# Patient Record
Sex: MALE | Race: WHITE | NOT HISPANIC OR LATINO | Employment: FULL TIME | URBAN - NONMETROPOLITAN AREA
[De-identification: names, ages, dates, MRNs, and addresses within clinical notes are randomized per-mention and may not be internally consistent; named-entity substitution may affect disease eponyms.]

---

## 2017-11-14 ENCOUNTER — OFFICE VISIT (OUTPATIENT)
Dept: RETAIL CLINIC | Facility: CLINIC | Age: 47
End: 2017-11-14

## 2017-11-14 VITALS
OXYGEN SATURATION: 99 % | SYSTOLIC BLOOD PRESSURE: 140 MMHG | TEMPERATURE: 98.6 F | HEIGHT: 74 IN | WEIGHT: 285 LBS | RESPIRATION RATE: 18 BRPM | HEART RATE: 94 BPM | DIASTOLIC BLOOD PRESSURE: 80 MMHG | BODY MASS INDEX: 36.57 KG/M2

## 2017-11-14 DIAGNOSIS — J30.2 ACUTE SEASONAL ALLERGIC RHINITIS, UNSPECIFIED TRIGGER: Primary | ICD-10-CM

## 2017-11-14 PROCEDURE — 99203 OFFICE O/P NEW LOW 30 MIN: CPT | Performed by: NURSE PRACTITIONER

## 2017-11-14 RX ORDER — DEXTROMETHORPHAN HYDROBROMIDE AND PROMETHAZINE HYDROCHLORIDE 15; 6.25 MG/5ML; MG/5ML
5 SYRUP ORAL 4 TIMES DAILY PRN
Qty: 118 ML | Refills: 0 | Status: SHIPPED | OUTPATIENT
Start: 2017-11-14 | End: 2023-01-11

## 2017-11-14 RX ORDER — AZITHROMYCIN 250 MG/1
TABLET, FILM COATED ORAL
Qty: 6 TABLET | Refills: 0 | Status: SHIPPED | OUTPATIENT
Start: 2017-11-14 | End: 2023-01-11

## 2017-11-14 NOTE — PROGRESS NOTES
Chief Complaint   Patient presents with   • Cough   • Sinusitis     Subjective   Kavon Carlson is a 47 y.o. male who presents to the clinic today with complaints of itchy/watery eyes, sinus pressure, rhinorrhea, and cough that has been present for about two days. It started after he had been working in the yard over the weekend. He has been taking Benadryl at night, although he has not noticed a significant improvement. He also took an OTC sinus medication last night and says he could tell it helped.  He denies fever.        The following portions of the patient's history were reviewed and updated as appropriate: allergies, past family history, past medical history, past social history, past surgical history and problem list.      Current Outpatient Prescriptions:   •  azithromycin (ZITHROMAX Z-MARCELINO) 250 MG tablet, Take 2 tablets the first day, then 1 tablet daily for 4 days., Disp: 6 tablet, Rfl: 0  •  promethazine-dextromethorphan (PROMETHAZINE-DM) 6.25-15 MG/5ML syrup, Take 5 mL by mouth 4 (Four) Times a Day As Needed for Cough., Disp: 118 mL, Rfl: 0  Allergies:  Review of patient's allergies indicates no known allergies.  Past Medical History:   Diagnosis Date   • Allergic    • Asthma      Past Surgical History:   Procedure Laterality Date   • KNEE SURGERY       Family History   Problem Relation Age of Onset   • Cancer Father    • Stroke Father    • Cancer Paternal Grandmother      Social History   Substance Use Topics   • Smoking status: Never Smoker   • Smokeless tobacco: None   • Alcohol use No       Review of Systems  Review of Systems   Constitutional: Negative for activity change, appetite change, chills, diaphoresis and fever.   HENT: Positive for congestion (nasal-intermittent), ear pain (pressure; bilateral), postnasal drip, rhinorrhea (clear), sinus pressure, sneezing and sore throat (scratchy). Negative for ear discharge, sinus pain, tinnitus and trouble swallowing.    Eyes: Positive for discharge  "(watery; bilateral), redness (bilateral) and itching (bilateral). Negative for photophobia, pain and visual disturbance.   Respiratory: Positive for cough (dry). Negative for chest tightness, shortness of breath and wheezing.    Cardiovascular: Negative for chest pain.   Gastrointestinal: Negative for diarrhea, nausea and vomiting.   Musculoskeletal: Negative for myalgias.   Allergic/Immunologic: Positive for environmental allergies.   Neurological: Negative for dizziness, light-headedness and headaches.       Objective   /80 (BP Location: Right arm, Patient Position: Sitting, Cuff Size: Adult)  Pulse 94  Temp 98.6 °F (37 °C) (Oral)   Resp 18  Ht 74\" (188 cm)  Wt 285 lb (129 kg)  SpO2 99%  BMI 36.59 kg/m2  Last 2 weights    11/14/17 0905   Weight: 285 lb (129 kg)       Physical Exam   Constitutional: He appears well-developed and well-nourished. No distress.   HENT:   Head: Normocephalic and atraumatic.   Right Ear: External ear normal. Tympanic membrane is not erythematous and not bulging. No middle ear effusion.   Left Ear: External ear normal. Tympanic membrane is not erythematous and not bulging. A middle ear effusion is present.   Nose: No mucosal edema or rhinorrhea. Right sinus exhibits frontal sinus tenderness. Right sinus exhibits no maxillary sinus tenderness. Left sinus exhibits frontal sinus tenderness. Left sinus exhibits no maxillary sinus tenderness.   Mouth/Throat: No oropharyngeal exudate or posterior oropharyngeal erythema. Tonsils are 1+ on the right. Tonsils are 1+ on the left. No tonsillar exudate.   Eyes: EOM are normal. Pupils are equal, round, and reactive to light. Right eye exhibits no discharge, no exudate and no hordeolum. Left eye exhibits no discharge, no exudate and no hordeolum. Right conjunctiva is injected (see picture). Left conjunctiva is injected (see picture).       Neck: Normal range of motion. Neck supple.   Cardiovascular: Normal rate and regular rhythm.  "   Pulmonary/Chest: Effort normal and breath sounds normal. He has no wheezes.   Musculoskeletal: Normal range of motion.   Lymphadenopathy:     He has no cervical adenopathy.   Neurological: He is alert.   Skin: Skin is warm and dry. He is not diaphoretic. No pallor.   Psychiatric: He has a normal mood and affect. His behavior is normal. Judgment and thought content normal.   Vitals reviewed.      Assessment/Plan     Kavon was seen today for cough and sinusitis.    Diagnoses and all orders for this visit:    Acute seasonal allergic rhinitis, unspecified trigger    Other orders  -     promethazine-dextromethorphan (PROMETHAZINE-DM) 6.25-15 MG/5ML syrup; Take 5 mL by mouth 4 (Four) Times a Day As Needed for Cough.  -     azithromycin (ZITHROMAX Z-MARCELINO) 250 MG tablet; Take 2 tablets the first day, then 1 tablet daily for 4 days.      Patient has medrol dose pack at home; I told him he could take this if desired, but he says he doesn't like how they make him feel. I also instructed him to take daily antihistamine ( he also has these at home).

## 2022-01-12 ENCOUNTER — HOSPITAL ENCOUNTER (OUTPATIENT)
Dept: GENERAL RADIOLOGY | Facility: HOSPITAL | Age: 52
Discharge: HOME | End: 2022-01-12
Admitting: INTERNAL MEDICINE

## 2022-01-12 ENCOUNTER — TRANSCRIBE ORDERS (OUTPATIENT)
Dept: GENERAL RADIOLOGY | Facility: HOSPITAL | Age: 52
End: 2022-01-12

## 2022-01-12 DIAGNOSIS — J06.9 ACUTE RESPIRATORY DISEASE: ICD-10-CM

## 2022-01-12 DIAGNOSIS — J06.9 ACUTE RESPIRATORY DISEASE: Primary | ICD-10-CM

## 2022-01-12 PROCEDURE — 71046 X-RAY EXAM CHEST 2 VIEWS: CPT

## 2022-12-12 ENCOUNTER — TRANSCRIBE ORDERS (OUTPATIENT)
Dept: ADMINISTRATIVE | Facility: HOSPITAL | Age: 52
End: 2022-12-12

## 2022-12-12 ENCOUNTER — HOSPITAL ENCOUNTER (OUTPATIENT)
Dept: GENERAL RADIOLOGY | Facility: HOSPITAL | Age: 52
Discharge: HOME | End: 2022-12-12

## 2022-12-12 DIAGNOSIS — R10.9 ABDOMINAL PAIN, UNSPECIFIED ABDOMINAL LOCATION: Primary | ICD-10-CM

## 2022-12-12 DIAGNOSIS — R11.2 NAUSEA AND VOMITING, UNSPECIFIED VOMITING TYPE: ICD-10-CM

## 2022-12-12 DIAGNOSIS — R19.7 DIARRHEA, UNSPECIFIED TYPE: ICD-10-CM

## 2022-12-12 PROCEDURE — 74018 RADEX ABDOMEN 1 VIEW: CPT

## 2023-01-11 ENCOUNTER — OFFICE VISIT (OUTPATIENT)
Dept: INTERNAL MEDICINE | Facility: CLINIC | Age: 53
End: 2023-01-11
Payer: COMMERCIAL

## 2023-01-11 ENCOUNTER — PATIENT ROUNDING (BHMG ONLY) (OUTPATIENT)
Dept: INTERNAL MEDICINE | Facility: CLINIC | Age: 53
End: 2023-01-11
Payer: COMMERCIAL

## 2023-01-11 ENCOUNTER — PRIOR AUTHORIZATION (OUTPATIENT)
Dept: INTERNAL MEDICINE | Facility: CLINIC | Age: 53
End: 2023-01-11
Payer: COMMERCIAL

## 2023-01-11 VITALS
OXYGEN SATURATION: 97 % | HEART RATE: 71 BPM | HEIGHT: 75 IN | SYSTOLIC BLOOD PRESSURE: 141 MMHG | DIASTOLIC BLOOD PRESSURE: 86 MMHG | BODY MASS INDEX: 31.46 KG/M2 | TEMPERATURE: 98.6 F | WEIGHT: 253 LBS

## 2023-01-11 DIAGNOSIS — L98.9 SKIN LESION OF LEFT LEG: ICD-10-CM

## 2023-01-11 DIAGNOSIS — N52.8 OTHER MALE ERECTILE DYSFUNCTION: ICD-10-CM

## 2023-01-11 DIAGNOSIS — Z12.5 PROSTATE CANCER SCREENING: ICD-10-CM

## 2023-01-11 DIAGNOSIS — I10 PRIMARY HYPERTENSION: ICD-10-CM

## 2023-01-11 DIAGNOSIS — R05.3 CHRONIC COUGH: ICD-10-CM

## 2023-01-11 DIAGNOSIS — E11.8 DIABETES MELLITUS TYPE 2 WITH COMPLICATIONS: Primary | ICD-10-CM

## 2023-01-11 DIAGNOSIS — E66.3 OVERWEIGHT: ICD-10-CM

## 2023-01-11 DIAGNOSIS — Z83.2 FAMILY HISTORY OF BLOOD DISEASE: ICD-10-CM

## 2023-01-11 DIAGNOSIS — R79.89 LOW TESTOSTERONE: ICD-10-CM

## 2023-01-11 LAB
EXPIRATION DATE: ABNORMAL
HBA1C MFR BLD: 11 %
Lab: ABNORMAL

## 2023-01-11 PROCEDURE — 83036 HEMOGLOBIN GLYCOSYLATED A1C: CPT | Performed by: INTERNAL MEDICINE

## 2023-01-11 PROCEDURE — 99204 OFFICE O/P NEW MOD 45 MIN: CPT | Performed by: INTERNAL MEDICINE

## 2023-01-11 RX ORDER — HYDROCODONE BITARTRATE AND HOMATROPINE METHYLBROMIDE ORAL SOLUTION 5; 1.5 MG/5ML; MG/5ML
2.5 LIQUID ORAL EVERY 6 HOURS PRN
Qty: 120 ML | Refills: 0 | Status: SHIPPED | OUTPATIENT
Start: 2023-01-11

## 2023-01-11 RX ORDER — DAPAGLIFLOZIN 10 MG/1
10 TABLET, FILM COATED ORAL DAILY
Qty: 30 TABLET | Refills: 1 | Status: SHIPPED | OUTPATIENT
Start: 2023-01-11

## 2023-01-11 RX ORDER — SEMAGLUTIDE 1.34 MG/ML
0.25 INJECTION, SOLUTION SUBCUTANEOUS WEEKLY
Qty: 1.5 ML | Refills: 2 | Status: SHIPPED | OUTPATIENT
Start: 2023-01-11 | End: 2023-02-21 | Stop reason: SDUPTHER

## 2023-01-11 RX ORDER — SILDENAFIL 50 MG/1
50 TABLET, FILM COATED ORAL DAILY PRN
Qty: 10 TABLET | Refills: 5 | Status: SHIPPED | OUTPATIENT
Start: 2023-01-11 | End: 2023-01-25 | Stop reason: SDUPTHER

## 2023-01-11 NOTE — TELEPHONE ENCOUNTER
[facility] PA (Shaw: [id number])  Ozempic (0.25 or 0.5 MG/DOSE) 2MG/1.5ML pen-injectors     Form  [facility] Electronic PA Form (2017 NCPDP)

## 2023-01-11 NOTE — PROGRESS NOTES
January 11, 2023    Hello, may I speak with Kavon Carlson?    My name is LIBRADO      I am  with [facility]  [address] [place] [address] [phone].    Before we get started may I verify your date of birth? 1970    I am calling to officially welcome you to our practice and ask about your recent visit. Is this a good time to talk? yes    Tell me about your visit with us. What things went well?  pt said she got a 10 and was sold on her before he came.  PT stated she has a lot of work to do and loved all the Nursing Staff and Joan who took his blood, she did a phenomenal job.       We're always looking for ways to make our patients' experiences even better. Do you have recommendations on ways we may improve?  no    Overall were you satisfied with your first visit to our practice? yes       I appreciate you taking the time to speak with me today. Is there anything else I can do for you? no      Thank you, and have a great day.

## 2023-01-11 NOTE — PROGRESS NOTES
"        Subjective     Chief Complaint   Patient presents with   • Abdominal Pain     Has some xray and ct scan,  having some trouble with going to rest room,            History of Present Illness  Patient states that he has not been to see an MD in awhile.     He wants to tell the whole picture.  Year before COVID he started to eat LTL diet. Taking BioComplete. Regular BMs and he was \"peeling some weight.\"  315 at his largest. Went down to 260 in about 3-4 months.   In the holidays he started to have hand and foot pain. Thought that it was arthritis or neuropathy but he didn't have the pins and needles feeling.   He saw the [place] and had some issues. His A1c was high.   Had stopped carbonated drinks and replaced with juice. He also had Ed and low testosterone. He states that he never took anything for it and has been a rollicoaster.   Maintaining. 250s for about 2 years now.     COVID x2. Ongoing issues with taste and smell.   Can't eat a lot at any given time.   Started taking Metformin, 2 years since February. Will have diarrhea if he misses a pill and tries to get back on it. Irregular bowel habits.     This fall stomach issues have gained intensity. Increasing stomach pain.   About half calf down on both legs he states that his skin hurts.   He is not checking his glucose currently and his last A1c was 10.   No difficulty with sleeping.     No HX of bleeding. Bowels or kidneys.   He states that occasionally he needs a cough medication and muscle relaxer. He states that he coughs like a bear. He says 3 doses of cough medication and he is done.     Patient's PMR from outside medical facility reviewed and noted.    Review of Systems   Constitutional: Negative for chills and fever.   HENT: Negative for congestion and rhinorrhea.    Respiratory: Negative for cough and shortness of breath.    Cardiovascular: Negative for chest pain and leg swelling.   Gastrointestinal: Positive for diarrhea. Negative for nausea. " "  Genitourinary: Negative for dysuria and hematuria.        ED   Neurological: Positive for numbness. Negative for weakness.   Psychiatric/Behavioral: Negative for dysphoric mood and sleep disturbance.      Otherwise complete ROS reviewed and negative except as mentioned in the HPI.    Past Medical History:   Past Medical History:   Diagnosis Date   • Allergic    • Asthma    • Depression @10 years ago    struggles mainly during the winter   • Diabetes mellitus (HCC) 2 years ago   • Erectile dysfunction @ a year ago     Past Surgical History:  Past Surgical History:   Procedure Laterality Date   • FRACTURE SURGERY  35 years ago    wrist   • KNEE SURGERY       Social History:  reports that he has never smoked. He has never used smokeless tobacco. He reports that he does not drink alcohol and does not use drugs.    Family History: family history includes Cancer in his father and paternal grandmother; Depression in his father; Hearing loss in his father; Stroke in his father.       Allergies:  No Known Allergies  Medications:  Prior to Admission medications    Medication Sig Start Date End Date Taking? Authorizing Provider   metFORMIN (GLUCOPHAGE) 500 MG tablet Take 500 mg by mouth Daily With Breakfast.   Yes Provider, MD Himanshu   azithromycin (ZITHROMAX Z-MARCELINO) 250 MG tablet Take 2 tablets the first day, then 1 tablet daily for 4 days. 11/14/17 1/11/23  Richa Cosby APRN   promethazine-dextromethorphan (PROMETHAZINE-DM) 6.25-15 MG/5ML syrup Take 5 mL by mouth 4 (Four) Times a Day As Needed for Cough. 11/14/17 1/11/23  Richa Cosby APRN       Objective     Vital Signs: /86 (BP Location: Left arm, Patient Position: Sitting, Cuff Size: Adult)   Pulse 71   Temp 98.6 °F (37 °C) (Infrared)   Ht 190.5 cm (75\")   Wt 115 kg (253 lb)   SpO2 97%   BMI 31.62 kg/m²   Physical Exam  Vitals reviewed.   Constitutional:       Appearance: He is obese.   HENT:      Head: Normocephalic and atraumatic.      Right Ear: " External ear normal.      Left Ear: External ear normal.      Nose: Nose normal.   Eyes:      General: No scleral icterus.     Conjunctiva/sclera: Conjunctivae normal.      Comments: Medial redness to the eyes with left eye blister.    Cardiovascular:      Rate and Rhythm: Normal rate and regular rhythm.      Heart sounds: Normal heart sounds.   Pulmonary:      Effort: Pulmonary effort is normal.      Breath sounds: Normal breath sounds.   Musculoskeletal:         General: No swelling or tenderness.      Cervical back: Normal range of motion and neck supple.   Skin:     General: Skin is warm and dry.      Comments: Open wound on the left pre-tibial surface. Mild drainage.    Neurological:      General: No focal deficit present.      Mental Status: He is alert.      Cranial Nerves: No cranial nerve deficit.   Psychiatric:         Mood and Affect: Mood normal.         Behavior: Behavior normal.       BMI is >= 30 and <35. (Class 1 Obesity). The following options were offered after discussion;: nutrition counseling/recommendations      Results Reviewed:  No results found for: GLUCOSE, BUN, CREATININE, NA, K, CL, CO2, CALCIUM, ALT, AST, WBC, HCT, PLT, CHOL, TRIG, HDL, LDL, LDLHDL, HGBA1C      Assessment / Plan     Assessment/Plan:  1. Diabetes mellitus type 2 with complications (HCC)  - POC Glycosylated Hemoglobin (Hb A1C)  - New RX:  - Farxiga 10 mg daily  - Ozempic 0.25 MG weekly     2. Low testosterone  - Testosterone    3. Overweight  - Vitamin D 25 hydroxy    4. Prostate cancer screening  - PSA SCREENING    5. Primary hypertension  - CBC w AUTO Differential  - Comprehensive metabolic panel  - T4  - TSH  - Lipid panel    6. Left leg wound  - Bactroban daily R called    7. ED  Viagra 50 mg PRN    8. Chronic cough  - Hycodan PRN    Reviewed [facility] AP [facility].     Return in about 4 weeks (around 2/8/2023) for Recheck, Next scheduled follow up. unless patient needs to be seen sooner or acute issues arise.    Code  Status: Full     I have discussed the patient results/orders and and plan/recommendation with them at today's visit.      Charo Girard, DO   01/11/2023

## 2023-01-12 ENCOUNTER — PRIOR AUTHORIZATION (OUTPATIENT)
Dept: INTERNAL MEDICINE | Facility: CLINIC | Age: 53
End: 2023-01-12
Payer: COMMERCIAL

## 2023-01-12 NOTE — TELEPHONE ENCOUNTER
Ozempic (0.25 or 0.5 MG/DOSE) 2MG/1.5ML pen-injectors    (Key: [id number])  Rx #: [id number]    Message from Plan  Your PA request has been approved. Additional information will be provided in the approval communication.

## 2023-01-13 LAB
25(OH)D3+25(OH)D2 SERPL-MCNC: 18.8 NG/ML (ref 30–100)
ALBUMIN SERPL-MCNC: 4.2 G/DL (ref 3.8–4.9)
ALBUMIN/GLOB SERPL: 1.7 {RATIO} (ref 1.2–2.2)
ALP SERPL-CCNC: 114 IU/L (ref 44–121)
ALT SERPL-CCNC: 19 IU/L (ref 0–44)
AST SERPL-CCNC: 15 IU/L (ref 0–40)
BASOPHILS # BLD AUTO: 0.1 X10E3/UL (ref 0–0.2)
BASOPHILS NFR BLD AUTO: 1 %
BILIRUB SERPL-MCNC: 0.8 MG/DL (ref 0–1.2)
BUN SERPL-MCNC: 13 MG/DL (ref 6–24)
BUN/CREAT SERPL: 14 (ref 9–20)
CALCIUM SERPL-MCNC: 9.2 MG/DL (ref 8.7–10.2)
CHLORIDE SERPL-SCNC: 100 MMOL/L (ref 96–106)
CHOLEST SERPL-MCNC: 269 MG/DL (ref 100–199)
CO2 SERPL-SCNC: 20 MMOL/L (ref 20–29)
CREAT SERPL-MCNC: 0.95 MG/DL (ref 0.76–1.27)
EGFRCR SERPLBLD CKD-EPI 2021: 95 ML/MIN/1.73
EOSINOPHIL # BLD AUTO: 0.3 X10E3/UL (ref 0–0.4)
EOSINOPHIL NFR BLD AUTO: 4 %
ERYTHROCYTE [DISTWIDTH] IN BLOOD BY AUTOMATED COUNT: 12.7 % (ref 11.6–15.4)
FACT V ACT/NOR PPP: 117 % (ref 70–150)
GLOBULIN SER CALC-MCNC: 2.5 G/DL (ref 1.5–4.5)
GLUCOSE SERPL-MCNC: 287 MG/DL (ref 70–99)
HCT VFR BLD AUTO: 50 % (ref 37.5–51)
HDLC SERPL-MCNC: 26 MG/DL
HGB BLD-MCNC: 17 G/DL (ref 13–17.7)
IMM GRANULOCYTES # BLD AUTO: 0.1 X10E3/UL (ref 0–0.1)
IMM GRANULOCYTES NFR BLD AUTO: 2 %
LDLC SERPL CALC-MCNC: 119 MG/DL (ref 0–99)
LYMPHOCYTES # BLD AUTO: 1.8 X10E3/UL (ref 0.7–3.1)
LYMPHOCYTES NFR BLD AUTO: 26 %
MCH RBC QN AUTO: 30.7 PG (ref 26.6–33)
MCHC RBC AUTO-ENTMCNC: 34 G/DL (ref 31.5–35.7)
MCV RBC AUTO: 90 FL (ref 79–97)
MONOCYTES # BLD AUTO: 0.4 X10E3/UL (ref 0.1–0.9)
MONOCYTES NFR BLD AUTO: 6 %
NEUTROPHILS # BLD AUTO: 4.1 X10E3/UL (ref 1.4–7)
NEUTROPHILS NFR BLD AUTO: 61 %
PLATELET # BLD AUTO: 182 X10E3/UL (ref 150–450)
POTASSIUM SERPL-SCNC: 4.7 MMOL/L (ref 3.5–5.2)
PROT SERPL-MCNC: 6.7 G/DL (ref 6–8.5)
PSA SERPL-MCNC: 2.2 NG/ML (ref 0–4)
RBC # BLD AUTO: 5.54 X10E6/UL (ref 4.14–5.8)
SODIUM SERPL-SCNC: 137 MMOL/L (ref 134–144)
T4 SERPL-MCNC: 6.6 UG/DL (ref 4.5–12)
TESTOST SERPL-MCNC: 162 NG/DL (ref 264–916)
TRIGL SERPL-MCNC: 681 MG/DL (ref 0–149)
TSH SERPL DL<=0.005 MIU/L-ACNC: 5.71 UIU/ML (ref 0.45–4.5)
VLDLC SERPL CALC-MCNC: 124 MG/DL (ref 5–40)
WBC # BLD AUTO: 6.8 X10E3/UL (ref 3.4–10.8)

## 2023-01-17 NOTE — PROGRESS NOTES
T is low.   LDL is too high  Vitamin D is too low    He needs medications for all of these issues. Is he OK with me calling them in?

## 2023-01-24 ENCOUNTER — TELEPHONE (OUTPATIENT)
Dept: INTERNAL MEDICINE | Facility: CLINIC | Age: 53
End: 2023-01-24
Payer: COMMERCIAL

## 2023-01-24 DIAGNOSIS — N52.8 OTHER MALE ERECTILE DYSFUNCTION: Primary | ICD-10-CM

## 2023-01-24 NOTE — TELEPHONE ENCOUNTER
Insurance will not pay for the Sildenafil   They suggest  Vardenafil 10mg,  Tablet  Tadalafil 10mg Tablet  Vardenafil 10mg odt    What do you suggest

## 2023-01-25 RX ORDER — TADALAFIL 10 MG/1
10 TABLET ORAL DAILY PRN
Qty: 10 TABLET | Refills: 5 | Status: SHIPPED | OUTPATIENT
Start: 2023-01-25

## 2023-01-26 ENCOUNTER — PRIOR AUTHORIZATION (OUTPATIENT)
Dept: INTERNAL MEDICINE | Facility: CLINIC | Age: 53
End: 2023-01-26
Payer: COMMERCIAL

## 2023-01-26 NOTE — TELEPHONE ENCOUNTER
Kavon Carlson (Key: [id number])Need help? Call us at [phone]  Status  Sent to [employer]  Next Steps  The plan will fax you a determination, typically within 1 to 5 business days.    How do I follow up?  Drug  Cialis 10MG tablets  Form  [facility] Non-Medicare Tiering Exception Form  Use this form to allow Non-Medicare patients to receive the non-formulary medication at the formulary brand copay.  [phone]  [phone]  Patient address book  Clear  Patient Details

## 2023-01-30 ENCOUNTER — PRIOR AUTHORIZATION (OUTPATIENT)
Dept: INTERNAL MEDICINE | Facility: CLINIC | Age: 53
End: 2023-01-30
Payer: COMMERCIAL

## 2023-01-30 NOTE — TELEPHONE ENCOUNTER
Farxiga 10MG tablets     Key: [id number] - PA Case ID: [id number] - Rx #: [id number]    Approved today  Your PA request has been approved. Additional information will be provided in the approval communication.

## 2023-02-06 DIAGNOSIS — R79.89 LOW TESTOSTERONE: Primary | ICD-10-CM

## 2023-02-06 RX ORDER — TESTOSTERONE GEL, 1% 10 MG/G
50 GEL TRANSDERMAL DAILY
Qty: 5 G | Refills: 3 | Status: SHIPPED | OUTPATIENT
Start: 2023-02-06

## 2023-02-07 ENCOUNTER — TELEPHONE (OUTPATIENT)
Dept: INTERNAL MEDICINE | Facility: CLINIC | Age: 53
End: 2023-02-07
Payer: COMMERCIAL

## 2023-02-09 ENCOUNTER — PRIOR AUTHORIZATION (OUTPATIENT)
Dept: INTERNAL MEDICINE | Facility: CLINIC | Age: 53
End: 2023-02-09
Payer: COMMERCIAL

## 2023-02-09 NOTE — TELEPHONE ENCOUNTER
[facility] AP Key: [id number] - PA Case ID: [id number] - Rx #: [id number] help? Call us at [phone]  Status  Sent to [facility]  Drug  Testosterone 50 MG/5GM(1%) gel  Form  Li Mendez PA Form (2017 NCPDP)  Original Claim Info  [address] CALL [phone].DRUG REQUIRES PRIOR AUTHORIZATION(PHARMACY HELP DESK [phone])

## 2023-02-19 ENCOUNTER — PATIENT MESSAGE (OUTPATIENT)
Dept: INTERNAL MEDICINE | Facility: CLINIC | Age: 53
End: 2023-02-19
Payer: COMMERCIAL

## 2023-02-19 DIAGNOSIS — E11.8 DIABETES MELLITUS TYPE 2 WITH COMPLICATIONS: ICD-10-CM

## 2023-02-19 RX ORDER — SEMAGLUTIDE 1.34 MG/ML
0.25 INJECTION, SOLUTION SUBCUTANEOUS WEEKLY
Qty: 1.5 ML | Refills: 2 | Status: CANCELLED | OUTPATIENT
Start: 2023-02-19

## 2023-02-20 NOTE — TELEPHONE ENCOUNTER
Rx Refill Note  Requested Prescriptions     Pending Prescriptions Disp Refills   • Semaglutide,0.25 or 0.5MG/DOS, (Ozempic, 0.25 or 0.5 MG/DOSE,) 2 MG/1.5ML solution pen-injector 1.5 mL 2     Sig: Inject 0.25 mg under the skin into the appropriate area as directed 1 (One) Time Per Week.      Last office visit with prescribing clinician: 1/11/2023    Next office visit with prescribing clinician: 5/9/2023                         Would you like a call back once the refill request has been completed: [] Yes [] No    If the office needs to give you a call back, can they leave a voicemail: [] Yes [] No    Anna Rivers RN  02/20/23, 07:05 CST

## 2023-02-21 DIAGNOSIS — E11.8 DIABETES MELLITUS TYPE 2 WITH COMPLICATIONS: ICD-10-CM

## 2023-02-21 RX ORDER — SEMAGLUTIDE 1.34 MG/ML
0.25 INJECTION, SOLUTION SUBCUTANEOUS WEEKLY
Qty: 1.5 ML | Refills: 2 | Status: SHIPPED | OUTPATIENT
Start: 2023-02-21 | End: 2023-03-29 | Stop reason: SDUPTHER

## 2023-03-29 DIAGNOSIS — E11.8 DIABETES MELLITUS TYPE 2 WITH COMPLICATIONS: ICD-10-CM

## 2023-03-29 NOTE — TELEPHONE ENCOUNTER
Rx Refill Note  Requested Prescriptions     Pending Prescriptions Disp Refills   • Semaglutide,0.25 or 0.5MG/DOS, (Ozempic, 0.25 or 0.5 MG/DOSE,) 2 MG/1.5ML solution pen-injector 1.5 mL 2     Sig: Inject 0.25 mg under the skin into the appropriate area as directed 1 (One) Time Per Week.      Last office visit with prescribing clinician: 1/11/2023   Last telemedicine visit with prescribing clinician: Visit date not found   Next office visit with prescribing clinician: 5/10/2023     POC Glycosylated Hemoglobin (Hb A1C) (01/11/2023 09:29)                      Would you like a call back once the refill request has been completed: [] Yes [] No    If the office needs to give you a call back, can they leave a voicemail: [] Yes [] No    Che Mello MA  03/29/23, 09:14 CDT

## 2023-03-29 NOTE — TELEPHONE ENCOUNTER
I called pt to reschedule an appt and pt stated he is out of Ozempic and was due his dose on Sunday.  Pt uses [facility] in [place].   Do we have samples pt can  if Ozempic isn't available or until Dr Girard can send in script?

## 2023-03-30 RX ORDER — SEMAGLUTIDE 1.34 MG/ML
0.25 INJECTION, SOLUTION SUBCUTANEOUS WEEKLY
Qty: 1.5 ML | Refills: 2 | Status: SHIPPED | OUTPATIENT
Start: 2023-03-30

## 2023-04-16 DIAGNOSIS — E11.8 DIABETES MELLITUS TYPE 2 WITH COMPLICATIONS: ICD-10-CM

## 2023-04-17 NOTE — TELEPHONE ENCOUNTER
Rx Refill Note  Requested Prescriptions     Pending Prescriptions Disp Refills   • dapagliflozin Propanediol (Farxiga) 10 MG tablet 30 tablet 1     Sig: Take 10 mg by mouth Daily.      Last office visit with prescribing clinician: 1/11/2023   Next office visit with prescribing clinician: 5/10/2023                         Would you like a call back once the refill request has been completed: [] Yes [] No    If the office needs to give you a call back, can they leave a voicemail: [] Yes [] No    Anna Rivers RN  04/17/23, 08:20 CDT

## 2023-04-18 RX ORDER — DAPAGLIFLOZIN 10 MG/1
10 TABLET, FILM COATED ORAL DAILY
Qty: 30 TABLET | Refills: 1 | Status: SHIPPED | OUTPATIENT
Start: 2023-04-18

## 2023-04-23 DIAGNOSIS — E11.8 DIABETES MELLITUS TYPE 2 WITH COMPLICATIONS: ICD-10-CM

## 2023-04-24 RX ORDER — SEMAGLUTIDE 1.34 MG/ML
0.5 INJECTION, SOLUTION SUBCUTANEOUS WEEKLY
Qty: 3 ML | Refills: 2 | Status: SHIPPED | OUTPATIENT
Start: 2023-04-24

## 2023-04-24 NOTE — TELEPHONE ENCOUNTER
Rx Refill Note  Requested Prescriptions     Pending Prescriptions Disp Refills   • Semaglutide,0.25 or 0.5MG/DOS, (Ozempic, 0.25 or 0.5 MG/DOSE,) 2 MG/1.5ML solution pen-injector 1.5 mL 2     Sig: Inject 0.25 mg under the skin into the appropriate area as directed 1 (One) Time Per Week.      Last office visit with prescribing clinician: 1/11/2023   Next office visit with prescribing clinician: 5/10/2023                         Would you like a call back once the refill request has been completed: [] Yes [] No    If the office needs to give you a call back, can they leave a voicemail: [] Yes [] No    Anna Rivers RN  04/24/23, 07:43 CDT

## 2023-05-10 ENCOUNTER — OFFICE VISIT (OUTPATIENT)
Dept: INTERNAL MEDICINE | Facility: CLINIC | Age: 53
End: 2023-05-10
Payer: COMMERCIAL

## 2023-05-10 VITALS
DIASTOLIC BLOOD PRESSURE: 80 MMHG | OXYGEN SATURATION: 99 % | BODY MASS INDEX: 32.08 KG/M2 | WEIGHT: 258 LBS | HEART RATE: 67 BPM | HEIGHT: 75 IN | SYSTOLIC BLOOD PRESSURE: 130 MMHG | TEMPERATURE: 98.4 F

## 2023-05-10 DIAGNOSIS — G62.9 NEUROPATHY: ICD-10-CM

## 2023-05-10 DIAGNOSIS — R79.89 LOW TESTOSTERONE IN MALE: ICD-10-CM

## 2023-05-10 DIAGNOSIS — I10 PRIMARY HYPERTENSION: ICD-10-CM

## 2023-05-10 DIAGNOSIS — E11.8 DIABETES MELLITUS TYPE 2 WITH COMPLICATIONS: Primary | ICD-10-CM

## 2023-05-10 LAB
EXPIRATION DATE: ABNORMAL
HBA1C MFR BLD: 6.8 %
Lab: ABNORMAL

## 2023-05-10 RX ORDER — SEMAGLUTIDE 1.34 MG/ML
0.5 INJECTION, SOLUTION SUBCUTANEOUS WEEKLY
Qty: 3 ML | Refills: 2 | Status: SHIPPED | OUTPATIENT
Start: 2023-05-10

## 2023-05-10 RX ORDER — GABAPENTIN 300 MG/1
300 CAPSULE ORAL NIGHTLY PRN
Qty: 30 CAPSULE | Refills: 2 | Status: SHIPPED | OUTPATIENT
Start: 2023-05-10

## 2023-05-10 RX ORDER — DAPAGLIFLOZIN 10 MG/1
10 TABLET, FILM COATED ORAL DAILY
Qty: 30 TABLET | Refills: 1 | Status: SHIPPED | OUTPATIENT
Start: 2023-05-10

## 2023-05-10 NOTE — PROGRESS NOTES
Subjective     Chief Complaint   Patient presents with   • Hypertension   • Diabetes       History of Present Illness  He states that his stomach is 1000% better. Mild constipation with the medication and it is improving over time. Might be a little sluggish or achy when it is a couple days to shot time.   If he could cut his hand and feet of he would be great.   Most active 3 months as far as energy level in several years.   His BP is much better.     At night when he takes his shoes and socks off the rest of the night his feet are not very comfortable. Position does not help.   He is only getting up once to go to the bathroom around 430-5 kathya.   Patient's PMR from outside medical facility reviewed and noted.    Review of Systems   Constitutional: Negative for chills and fever.   HENT: Negative for congestion and rhinorrhea.    Respiratory: Negative for cough and shortness of breath.    Cardiovascular: Negative for chest pain and leg swelling.   Gastrointestinal: Negative for constipation, diarrhea and nausea.   Genitourinary: Negative for dysuria and hematuria.   Musculoskeletal:        Bilateral foot pain.    Skin: Negative for color change and wound.        Previous left leg wound is improving.       Otherwise complete ROS reviewed and negative except as mentioned in the HPI.    Past Medical History:   Past Medical History:   Diagnosis Date   • Allergic    • Asthma    • Depression @10 years ago    struggles mainly during the winter   • Diabetes mellitus 2 years ago   • Erectile dysfunction @ a year ago     Past Surgical History:  Past Surgical History:   Procedure Laterality Date   • FRACTURE SURGERY  35 years ago    wrist   • KNEE SURGERY       Social History:  reports that he has never smoked. He has never used smokeless tobacco. He reports that he does not drink alcohol and does not use drugs.    Family History: family history includes Cancer in his father and paternal grandmother; Depression in his  "father; Hearing loss in his father; Stroke in his father.      Allergies:  No Known Allergies  Medications:  Prior to Admission medications    Medication Sig Start Date End Date Taking? Authorizing Provider   dapagliflozin Propanediol (Farxiga) 10 MG tablet Take 10 mg by mouth Daily. 4/18/23  Yes Charo Girard DO   HYDROcodone Bit-Homatrop MBr (HYCODAN) 5-1.5 MG/5ML solution Take 2.5 mL by mouth Every 6 (Six) Hours As Needed for Cough. 1/11/23  Yes Charo Girard DO   mupirocin (BACTROBAN) 2 % ointment Apply 1 application topically to the appropriate area as directed Daily. 1/11/23  Yes Charo Girard DO   Semaglutide,0.25 or 0.5MG/DOS, (Ozempic, 0.25 or 0.5 MG/DOSE,) 2 MG/1.5ML solution pen-injector Inject 0.5 mg under the skin into the appropriate area as directed 1 (One) Time Per Week. 4/24/23  Yes Charo Girard DO   tadalafil (Cialis) 10 MG tablet Take 1 tablet by mouth Daily As Needed for Erectile Dysfunction. 1/25/23  Yes Charo Girard DO   testosterone (ANDROGEL) 50 MG/5GM (1%) gel gel Place 50 mg on the skin as directed by provider Daily. 2/6/23  Yes Charo Girard DO   metFORMIN (GLUCOPHAGE) 500 MG tablet Take 500 mg by mouth Daily With Breakfast.  5/10/23  Provider, MD Himanshu       Objective     Vital Signs: /80 (BP Location: Left arm, Patient Position: Sitting, Cuff Size: Adult)   Pulse 67   Temp 98.4 °F (36.9 °C) (Infrared)   Ht 190.5 cm (75\")   Wt 117 kg (258 lb)   SpO2 99%   BMI 32.25 kg/m²   Physical Exam  Vitals reviewed.   Constitutional:       Appearance: Normal appearance.   HENT:      Head: Normocephalic and atraumatic.      Right Ear: External ear normal.      Left Ear: External ear normal.      Nose: Nose normal.   Eyes:      General: No scleral icterus.     Conjunctiva/sclera: Conjunctivae normal.   Cardiovascular:      Rate and Rhythm: Normal rate and regular rhythm.      Heart sounds: Normal heart sounds.   Pulmonary:      Effort: Pulmonary " effort is normal.      Breath sounds: Normal breath sounds.   Musculoskeletal:         General: No swelling or tenderness.      Cervical back: Normal range of motion and neck supple.   Skin:     General: Skin is warm and dry.   Neurological:      Mental Status: He is alert and oriented to person, place, and time.      Cranial Nerves: No cranial nerve deficit.   Psychiatric:         Mood and Affect: Mood normal.         Behavior: Behavior normal.       BMI is >= 30 and <35. (Class 1 Obesity). The following options were offered after discussion;: nutrition counseling/recommendations      Results Reviewed:  Glucose   Date Value Ref Range Status   01/11/2023 287 (H) 70 - 99 mg/dL Final     BUN   Date Value Ref Range Status   01/11/2023 13 6 - 24 mg/dL Final     Creatinine   Date Value Ref Range Status   01/11/2023 0.95 0.76 - 1.27 mg/dL Final     Sodium   Date Value Ref Range Status   01/11/2023 137 134 - 144 mmol/L Final     Potassium   Date Value Ref Range Status   01/11/2023 4.7 3.5 - 5.2 mmol/L Final     Chloride   Date Value Ref Range Status   01/11/2023 100 96 - 106 mmol/L Final     Total CO2   Date Value Ref Range Status   01/11/2023 20 20 - 29 mmol/L Final     Calcium   Date Value Ref Range Status   01/11/2023 9.2 8.7 - 10.2 mg/dL Final     ALT (SGPT)   Date Value Ref Range Status   01/11/2023 19 0 - 44 IU/L Final     AST (SGOT)   Date Value Ref Range Status   01/11/2023 15 0 - 40 IU/L Final     WBC   Date Value Ref Range Status   01/11/2023 6.8 3.4 - 10.8 x10E3/uL Final     Hematocrit   Date Value Ref Range Status   01/11/2023 50.0 37.5 - 51.0 % Final     Platelets   Date Value Ref Range Status   01/11/2023 182 150 - 450 x10E3/uL Final     Triglycerides   Date Value Ref Range Status   01/11/2023 681 (H) 0 - 149 mg/dL Final     HDL Cholesterol   Date Value Ref Range Status   01/11/2023 26 (L) >39 mg/dL Final     LDL Chol Calc (NIH)   Date Value Ref Range Status   01/11/2023 119 (H) 0 - 99 mg/dL Final      Hemoglobin A1C   Date Value Ref Range Status   01/11/2023 11.0 % Final     Assessment / Plan     Assessment/Plan:  1. Diabetes mellitus type 2 with complications  - POC Glycosylated Hemoglobin (Hb A1C)  - Ozempic and Farxiga  - A1c is now less than 6.8     2. Neuropathy  New  - gabapentin (NEURONTIN) 300 MG capsule; Take 1 capsule by mouth At Night As Needed (Diabetic neuropathy).  Dispense: 30 capsule; Refill: 2    3. Primary hypertension  - Controlled on current therapy.     4. Low testosterone in male  - He has just gotten the Androgel and has not started using it yet.     Refilled Ozempic and Farxiga.     Return in about 3 months (around 8/10/2023) for Recheck. unless patient needs to be seen sooner or acute issues arise.    Code Status: Full    I have discussed the patient results/orders and and plan/recommendation with them at today's visit.      Charo Girard, DO   05/10/2023

## 2023-06-18 DIAGNOSIS — E11.8 DIABETES MELLITUS TYPE 2 WITH COMPLICATIONS: ICD-10-CM

## 2023-06-19 RX ORDER — SEMAGLUTIDE 1.34 MG/ML
0.5 INJECTION, SOLUTION SUBCUTANEOUS WEEKLY
Qty: 3 ML | Refills: 2 | Status: SHIPPED | OUTPATIENT
Start: 2023-06-19

## 2023-06-19 NOTE — TELEPHONE ENCOUNTER
Rx Refill Note  Requested Prescriptions     Pending Prescriptions Disp Refills    Semaglutide,0.25 or 0.5MG/DOS, (Ozempic, 0.25 or 0.5 MG/DOSE,) 2 MG/1.5ML solution pen-injector 3 mL 2     Sig: Inject 0.5 mg under the skin into the appropriate area as directed 1 (One) Time Per Week.      Last office visit with prescribing clinician: 5/10/2023   Next office visit with prescribing clinician: 11/20/2023                         Would you like a call back once the refill request has been completed: [] Yes [] No    If the office needs to give you a call back, can they leave a voicemail: [] Yes [] No    Anna Rivers RN  06/19/23, 06:44 CDT

## 2023-06-26 PROBLEM — E11.8 DIABETES MELLITUS TYPE 2 WITH COMPLICATIONS: Status: ACTIVE | Noted: 2023-06-26

## 2023-08-18 DIAGNOSIS — E11.8 DIABETES MELLITUS TYPE 2 WITH COMPLICATIONS: ICD-10-CM

## 2023-08-18 RX ORDER — SEMAGLUTIDE 1.34 MG/ML
0.5 INJECTION, SOLUTION SUBCUTANEOUS WEEKLY
Qty: 3 ML | Refills: 2 | Status: SHIPPED | OUTPATIENT
Start: 2023-08-18

## 2023-08-18 NOTE — TELEPHONE ENCOUNTER
Rx Refill Note  Requested Prescriptions     Pending Prescriptions Disp Refills    Semaglutide,0.25 or 0.5MG/DOS, (Ozempic, 0.25 or 0.5 MG/DOSE,) 2 MG/1.5ML solution pen-injector 3 mL 2     Sig: Inject 0.5 mg under the skin into the appropriate area as directed 1 (One) Time Per Week.      Last office visit with prescribing clinician: 6/26/2023   Last telemedicine visit with prescribing clinician: Visit date not found   Next office visit with prescribing clinician: 11/20/2023         PATIENT ASKING FOR 3 MONTHS SUPPLY.                       Would you like a call back once the refill request has been completed: [] Yes [] No    If the office needs to give you a call back, can they leave a voicemail: [] Yes [] No    Che Mello MA  08/18/23, 16:46 CDT

## 2023-09-05 DIAGNOSIS — E11.8 DIABETES MELLITUS TYPE 2 WITH COMPLICATIONS: ICD-10-CM

## 2023-09-05 RX ORDER — SEMAGLUTIDE 1.34 MG/ML
0.5 INJECTION, SOLUTION SUBCUTANEOUS WEEKLY
Qty: 3 ML | Refills: 11 | Status: SHIPPED | OUTPATIENT
Start: 2023-09-05

## 2023-09-05 RX ORDER — DAPAGLIFLOZIN 10 MG/1
10 TABLET, FILM COATED ORAL DAILY
Qty: 30 TABLET | Refills: 11 | Status: SHIPPED | OUTPATIENT
Start: 2023-09-05

## 2023-11-08 ENCOUNTER — OFFICE VISIT (OUTPATIENT)
Dept: FAMILY MEDICINE CLINIC | Facility: CLINIC | Age: 53
End: 2023-11-08
Payer: COMMERCIAL

## 2023-11-08 VITALS
RESPIRATION RATE: 20 BRPM | HEIGHT: 75 IN | DIASTOLIC BLOOD PRESSURE: 83 MMHG | HEART RATE: 69 BPM | BODY MASS INDEX: 32.33 KG/M2 | WEIGHT: 260 LBS | SYSTOLIC BLOOD PRESSURE: 131 MMHG | TEMPERATURE: 97.3 F

## 2023-11-08 DIAGNOSIS — Z00.00 WELLNESS EXAMINATION: Primary | ICD-10-CM

## 2023-11-08 DIAGNOSIS — Z12.11 COLON CANCER SCREENING: ICD-10-CM

## 2023-11-08 DIAGNOSIS — E55.9 VITAMIN D DEFICIENCY: ICD-10-CM

## 2023-11-08 DIAGNOSIS — Z12.5 SCREENING PSA (PROSTATE SPECIFIC ANTIGEN): ICD-10-CM

## 2023-11-08 DIAGNOSIS — G62.9 NEUROPATHY: ICD-10-CM

## 2023-11-08 DIAGNOSIS — R79.89 LOW TESTOSTERONE: ICD-10-CM

## 2023-11-08 DIAGNOSIS — E11.8 DIABETES MELLITUS TYPE 2 WITH COMPLICATIONS: ICD-10-CM

## 2023-11-08 RX ORDER — GABAPENTIN 300 MG/1
300 CAPSULE ORAL NIGHTLY PRN
Qty: 30 CAPSULE | Refills: 2 | Status: SHIPPED | OUTPATIENT
Start: 2023-11-08

## 2023-11-08 RX ORDER — SEMAGLUTIDE 1.34 MG/ML
0.5 INJECTION, SOLUTION SUBCUTANEOUS WEEKLY
Qty: 3 ML | Refills: 11 | Status: SHIPPED | OUTPATIENT
Start: 2023-11-08

## 2023-11-08 RX ORDER — DAPAGLIFLOZIN 10 MG/1
10 TABLET, FILM COATED ORAL DAILY
Qty: 30 TABLET | Refills: 11 | Status: SHIPPED | OUTPATIENT
Start: 2023-11-08

## 2023-11-08 NOTE — PROGRESS NOTES
"Chief Complaint  Establish Care, Diabetes, Med Refill, and Annual Exam    Subjective    History of Present Illness      Patient presents to [facility] for   History of Present Illness  He is here to refill meds and establish care. Needs Ozempic but he is having some GI issues with it. Feels he may need higher dose. Wants to discus some shoulder pain.        Review of Systems    I have reviewed and agree with the HPI information as above.  Purvi Bui, APRN     Objective   Vital Signs:   /83   Pulse 69   Temp 97.3 °F (36.3 °C)   Resp 20   Ht 190.5 cm (75\")   Wt 118 kg (260 lb)   BMI 32.50 kg/m²            Physical Exam  Vitals and nursing note reviewed.   Constitutional:       Appearance: Normal appearance. He is well-developed. He is obese.   HENT:      Head: Normocephalic and atraumatic.      Right Ear: Tympanic membrane, ear canal and external ear normal.      Left Ear: Tympanic membrane, ear canal and external ear normal.      Nose: Nose normal. No septal deviation, nasal tenderness or congestion.      Mouth/Throat:      Lips: Pink. No lesions.      Mouth: Mucous membranes are moist. No oral lesions.      Dentition: Normal dentition.      Pharynx: Oropharynx is clear. No pharyngeal swelling, oropharyngeal exudate or posterior oropharyngeal erythema.   Eyes:      General: Lids are normal. Vision grossly intact. No scleral icterus.        Right eye: No discharge.         Left eye: No discharge.      Extraocular Movements: Extraocular movements intact.      Conjunctiva/sclera: Conjunctivae normal.      Right eye: Right conjunctiva is not injected.      Left eye: Left conjunctiva is not injected.      Pupils: Pupils are equal, round, and reactive to light.   Neck:      Thyroid: No thyroid mass.      Trachea: Trachea normal.   Cardiovascular:      Rate and Rhythm: Normal rate and regular rhythm.      Heart sounds: Normal heart sounds. No murmur heard.     No gallop. "   Pulmonary:      Effort: Pulmonary effort is normal.      Breath sounds: Normal breath sounds and air entry. No wheezing, rhonchi or rales.   Musculoskeletal:         General: No tenderness or deformity. Normal range of motion.      Cervical back: Full passive range of motion without pain, normal range of motion and neck supple.      Thoracic back: Normal.      Right lower leg: No edema.      Left lower leg: No edema.   Skin:     General: Skin is warm and dry.      Coloration: Skin is not jaundiced.      Findings: No rash.   Neurological:      Mental Status: He is alert and oriented to person, place, and time.      Sensory: Sensation is intact.      Motor: Motor function is intact.      Coordination: Coordination is intact.      Gait: Gait is intact.      Deep Tendon Reflexes: Reflexes are normal and symmetric.   Psychiatric:         Mood and Affect: Mood and affect normal.         Behavior: Behavior normal.         Judgment: Judgment normal.          RONNA-7: Over the last two weeks, how often have you been bothered by the following problems?  If you checked any problems, how difficult have these problems made it for you to do your work, take care of things at home, or get along with other people: Not difficult at all    PHQ-2 Depression Screening  Little interest or pleasure in doing things? 0-->not at all   Feeling down, depressed, or hopeless? 0-->not at all   PHQ-2 Total Score 0     PHQ-9 Depression Screening  Little interest or pleasure in doing things? 0-->not at all   Feeling down, depressed, or hopeless? 0-->not at all   Trouble falling or staying asleep, or sleeping too much?     Feeling tired or having little energy?     Poor appetite or overeating?     Feeling bad about yourself - or that you are a failure or have let yourself or your family down?     Trouble concentrating on things, such as reading the newspaper or watching television?     Moving or speaking so slowly that other people could have  noticed? Or the opposite - being so fidgety or restless that you have been moving around a lot more than usual?     Thoughts that you would be better off dead, or of hurting yourself in some way?     PHQ-9 Total Score 0   If you checked off any problems, how difficult have these problems made it for you to do your work, take care of things at home, or get along with other people?        Result Review  Data Reviewed:                   Assessment and Plan      Diagnoses and all orders for this visit:    1. Wellness examination (Primary)  -     CBC Auto Differential; Future  -     Comprehensive Metabolic Panel; Future  -     Hemoglobin A1c; Future  -     Lipid Panel; Future  -     MicroAlbumin, Urine, Random - Urine, Clean Catch; Future  -     Urinalysis With Culture If Indicated - Urine, Clean Catch; Future  -     PSA Screen; Future  -     TSH; Future  -     Vitamin D,25-Hydroxy; Future    2. Diabetes mellitus type 2 with complications  -     Hemoglobin A1c; Future  -     dapagliflozin Propanediol (Farxiga) 10 MG tablet; Take 10 mg by mouth Daily.  Dispense: 30 tablet; Refill: 11  -     Semaglutide,0.25 or 0.5MG/DOS, (Ozempic, 0.25 or 0.5 MG/DOSE,) 2 MG/1.5ML solution pen-injector; Inject 0.5 mg under the skin into the appropriate area as directed 1 (One) Time Per Week.  Dispense: 3 mL; Refill: 11    3. Low testosterone    4. Neuropathy  -     gabapentin (NEURONTIN) 300 MG capsule; Take 1 capsule by mouth At Night As Needed (Diabetic neuropathy).  Dispense: 30 capsule; Refill: 2    5. Screening PSA (prostate specific antigen)  -     TSH; Future    6. Vitamin D deficiency  -     Vitamin D,25-Hydroxy; Future    7. Colon cancer screening  -     Cologuard - Stool, Per Rectum; Future    Wellness exam completed today. Labs ordered today, but he is not fasting so he will return to complete those.   Patient is changing care to our office from [facility]. He was recently diagnosed with Type 2 DM. His A1c was 11. He was  started on ozempic and Farxiga and his last A1c was 6.6 . He was also prescribed neurontin for neuropathy but he states that since his BS are under control his neuropathy has greatly improved and he only takes it on a PRN basis. He was also originally prescribed testosterone but he states that once again, since his A1c has improved, his fatigue has improved so he did not start that either.   He also has scarring on his legs from nonhealing wounds that he did have, however, those have greatly improved with improvement of his A1c.   Patient has never completed a colon cancer screening. He has declined colonoscopy, but has agreed to cologuard.         Follow Up   No follow-ups on file.  Patient was given instructions and counseling regarding his condition or for health maintenance advice. Please see specific information pulled into the AVS if appropriate.     Preventive Care 40-64 Years Old, Male  Preventive care refers to lifestyle choices and visits with your health care provider that can promote health and wellness. Preventive care visits are also called wellness exams.  What can I expect for my preventive care visit?  Counseling  During your preventive care visit, your health care provider may ask about your:  Medical history, including:  Past medical problems.  Family medical history.  Current health, including:  Emotional well-being.  Home life and relationship well-being.  Sexual activity.  Lifestyle, including:  Alcohol, nicotine or tobacco, and drug use.  Access to firearms.  Diet, exercise, and sleep habits.  Safety issues such as seatbelt and bike helmet use.  Sunscreen use.  Work and work environment.  Physical exam  Your health care provider will check your:  Height and weight. These may be used to calculate your BMI (body mass index). BMI is a measurement that tells if you are at a healthy weight.  Waist circumference. This measures the distance around your waistline. This measurement also tells if you  are at a healthy weight and may help predict your risk of certain diseases, such as type 2 diabetes and high blood pressure.  Heart rate and blood pressure.  Body temperature.  Skin for abnormal spots.  What immunizations do I need?    Vaccines are usually given at various ages, according to a schedule. Your health care provider will recommend vaccines for you based on your age, medical history, and lifestyle or other factors, such as travel or where you work.  What tests do I need?  Screening  Your health care provider may recommend screening tests for certain conditions. This may include:  Lipid and cholesterol levels.  Diabetes screening. This is done by checking your blood sugar (glucose) after you have not eaten for a while (fasting).  Hepatitis B test.  Hepatitis C test.  HIV (human immunodeficiency virus) test.  STI (sexually transmitted infection) testing, if you are at risk.  Lung cancer screening.  Prostate cancer screening.  Colorectal cancer screening.  Talk with your health care provider about your test results, treatment options, and if necessary, the need for more tests.  Follow these instructions at home:  Eating and drinking    Eat a diet that includes fresh fruits and vegetables, whole grains, lean protein, and low-fat dairy products.  Take vitamin and mineral supplements as recommended by your health care provider.  Do not drink alcohol if your health care provider tells you not to drink.  If you drink alcohol:  Limit how much you have to 0-2 drinks a day.  Know how much alcohol is in your drink. In the [place] one drink equals one 12 oz bottle of beer (355 mL), one 5 oz glass of wine (148 mL), or one 1½ oz glass of hard liquor (44 mL).  Lifestyle  Brush your teeth every morning and night with fluoride toothpaste. Floss one time each day.  Exercise for at least 30 minutes 5 or more days each week.  Do not use any products that contain nicotine or tobacco. These products include cigarettes, chewing  tobacco, and vaping devices, such as e-cigarettes. If you need help quitting, ask your health care provider.  Do not use drugs.  If you are sexually active, practice safe sex. Use a condom or other form of protection to prevent STIs.  Take aspirin only as told by your health care provider. Make sure that you understand how much to take and what form to take. Work with your health care provider to find out whether it is safe and beneficial for you to take aspirin daily.  Find healthy ways to manage stress, such as:  Meditation, yoga, or listening to music.  Journaling.  Talking to a trusted person.  Spending time with friends and family.  Minimize exposure to UV radiation to reduce your risk of skin cancer.  Safety  Always wear your seat belt while driving or riding in a vehicle.  Do not drive:  If you have been drinking alcohol. Do not ride with someone who has been drinking.  When you are tired or distracted.  While texting.  If you have been using any mind-altering substances or drugs.  Wear a helmet and other protective equipment during sports activities.  If you have firearms in your house, make sure you follow all gun safety procedures.  What's next?  Go to your health care provider once a year for an annual wellness visit.  Ask your health care provider how often you should have your eyes and teeth checked.  Stay up to date on all vaccines.  This information is not intended to replace advice given to you by your health care provider. Make sure you discuss any questions you have with your health care provider.  Document Revised: 06/15/2022 Document Reviewed: 06/15/2022  [employer] Patient Education © 2023 [employer]

## 2023-12-19 ENCOUNTER — TELEPHONE (OUTPATIENT)
Dept: FAMILY MEDICINE CLINIC | Facility: CLINIC | Age: 53
End: 2023-12-19
Payer: COMMERCIAL

## 2023-12-20 ENCOUNTER — LAB (OUTPATIENT)
Dept: LAB | Facility: HOSPITAL | Age: 53
End: 2023-12-20
Payer: COMMERCIAL

## 2023-12-20 DIAGNOSIS — J06.9 UPPER RESPIRATORY TRACT INFECTION, UNSPECIFIED TYPE: Primary | ICD-10-CM

## 2023-12-20 DIAGNOSIS — Z12.5 SCREENING PSA (PROSTATE SPECIFIC ANTIGEN): ICD-10-CM

## 2023-12-20 DIAGNOSIS — E11.8 DIABETES MELLITUS TYPE 2 WITH COMPLICATIONS: ICD-10-CM

## 2023-12-20 DIAGNOSIS — Z00.00 WELLNESS EXAMINATION: ICD-10-CM

## 2023-12-20 DIAGNOSIS — E55.9 VITAMIN D DEFICIENCY: ICD-10-CM

## 2023-12-20 LAB
25(OH)D3 SERPL-MCNC: 25.2 NG/ML (ref 30–100)
ALBUMIN SERPL-MCNC: 4.2 G/DL (ref 3.5–5)
ALBUMIN UR-MCNC: 2.3 MG/DL
ALBUMIN/GLOB SERPL: 1.2 G/DL (ref 1.1–2.5)
ALP SERPL-CCNC: 114 U/L (ref 24–120)
ALT SERPL W P-5'-P-CCNC: 26 U/L (ref 0–50)
ANION GAP SERPL CALCULATED.3IONS-SCNC: 7 MMOL/L (ref 4–13)
AST SERPL-CCNC: 22 U/L (ref 7–45)
AUTO MIXED CELLS #: 0.9 10*3/MM3 (ref 0.1–2.6)
AUTO MIXED CELLS %: 10 % (ref 0.1–24)
BILIRUB SERPL-MCNC: 0.9 MG/DL (ref 0.1–1)
BILIRUB UR QL STRIP: NEGATIVE
BUN SERPL-MCNC: 15 MG/DL (ref 5–21)
BUN/CREAT SERPL: 15.8
CALCIUM SPEC-SCNC: 9.4 MG/DL (ref 8.4–10.4)
CHLORIDE SERPL-SCNC: 105 MMOL/L (ref 98–110)
CHOLEST SERPL-MCNC: 147 MG/DL (ref 130–200)
CLARITY UR: CLEAR
CO2 SERPL-SCNC: 28 MMOL/L (ref 24–31)
COLOR UR: YELLOW
CREAT SERPL-MCNC: 0.95 MG/DL (ref 0.5–1.4)
EGFRCR SERPLBLD CKD-EPI 2021: 94.5 ML/MIN/1.73
ERYTHROCYTE [DISTWIDTH] IN BLOOD BY AUTOMATED COUNT: 13.5 % (ref 12.3–15.4)
GLOBULIN UR ELPH-MCNC: 3.4 GM/DL
GLUCOSE SERPL-MCNC: 142 MG/DL (ref 70–100)
GLUCOSE UR STRIP-MCNC: ABNORMAL MG/DL
HBA1C MFR BLD: 6.6 % (ref 4.8–5.9)
HCT VFR BLD AUTO: 51.8 % (ref 37.5–51)
HDLC SERPL-MCNC: 41 MG/DL
HGB BLD-MCNC: 16.5 G/DL (ref 13–17.7)
HGB UR QL STRIP.AUTO: NEGATIVE
KETONES UR QL STRIP: NEGATIVE
LDLC SERPL CALC-MCNC: 60 MG/DL (ref 0–99)
LDLC/HDLC SERPL: 1.15 {RATIO}
LEUKOCYTE ESTERASE UR QL STRIP.AUTO: NEGATIVE
LYMPHOCYTES # BLD AUTO: 1.4 10*3/MM3 (ref 0.7–3.1)
LYMPHOCYTES NFR BLD AUTO: 15.7 % (ref 19.6–45.3)
MCH RBC QN AUTO: 29.1 PG (ref 26.6–33)
MCHC RBC AUTO-ENTMCNC: 31.9 G/DL (ref 31.5–35.7)
MCV RBC AUTO: 91.4 FL (ref 79–97)
NEUTROPHILS NFR BLD AUTO: 6.9 10*3/MM3 (ref 1.7–7)
NEUTROPHILS NFR BLD AUTO: 74.3 % (ref 42.7–76)
NITRITE UR QL STRIP: NEGATIVE
PH UR STRIP.AUTO: 6 [PH] (ref 5–8)
PLATELET # BLD AUTO: 163 10*3/MM3 (ref 140–450)
PMV BLD AUTO: 10.8 FL (ref 6–12)
POTASSIUM SERPL-SCNC: 4.6 MMOL/L (ref 3.5–5.3)
PROT SERPL-MCNC: 7.6 G/DL (ref 6.3–8.7)
PROT UR QL STRIP: NEGATIVE
PSA SERPL-MCNC: 2.84 NG/ML (ref 0–4)
RBC # BLD AUTO: 5.67 10*6/MM3 (ref 4.14–5.8)
SODIUM SERPL-SCNC: 140 MMOL/L (ref 135–145)
SP GR UR STRIP: 1.02 (ref 1–1.03)
TRIGL SERPL-MCNC: 294 MG/DL (ref 0–149)
TSH SERPL DL<=0.05 MIU/L-ACNC: 5.25 UIU/ML (ref 0.27–4.2)
UROBILINOGEN UR QL STRIP: ABNORMAL
VLDLC SERPL-MCNC: 46 MG/DL (ref 5–40)
WBC NRBC COR # BLD AUTO: 9.2 10*3/MM3 (ref 3.4–10.8)

## 2023-12-20 PROCEDURE — 83036 HEMOGLOBIN GLYCOSYLATED A1C: CPT

## 2023-12-20 PROCEDURE — 82043 UR ALBUMIN QUANTITATIVE: CPT

## 2023-12-20 PROCEDURE — G0103 PSA SCREENING: HCPCS

## 2023-12-20 PROCEDURE — 80061 LIPID PANEL: CPT

## 2023-12-20 PROCEDURE — 80050 GENERAL HEALTH PANEL: CPT

## 2023-12-20 PROCEDURE — 82306 VITAMIN D 25 HYDROXY: CPT

## 2023-12-20 PROCEDURE — 36415 COLL VENOUS BLD VENIPUNCTURE: CPT

## 2023-12-20 PROCEDURE — 81003 URINALYSIS AUTO W/O SCOPE: CPT

## 2023-12-20 RX ORDER — DEXTROMETHORPHAN HYDROBROMIDE AND PROMETHAZINE HYDROCHLORIDE 15; 6.25 MG/5ML; MG/5ML
5 SYRUP ORAL 4 TIMES DAILY PRN
Qty: 180 ML | Refills: 0 | Status: SHIPPED | OUTPATIENT
Start: 2023-12-20

## 2023-12-20 RX ORDER — AZITHROMYCIN 250 MG/1
TABLET, FILM COATED ORAL
Qty: 6 TABLET | Refills: 0 | Status: SHIPPED | OUTPATIENT
Start: 2023-12-20

## 2023-12-20 RX ORDER — METHYLPREDNISOLONE 4 MG/1
TABLET ORAL
Qty: 21 TABLET | Refills: 0 | Status: SHIPPED | OUTPATIENT
Start: 2023-12-20

## 2023-12-26 ENCOUNTER — TELEPHONE (OUTPATIENT)
Dept: FAMILY MEDICINE CLINIC | Facility: CLINIC | Age: 53
End: 2023-12-26
Payer: COMMERCIAL

## 2023-12-26 NOTE — TELEPHONE ENCOUNTER
----- Message from BHARGAVI Dent sent at 12/26/2023  8:28 AM CST -----  Vitmain D is low. Take an OTC supplement daily  TSH his very mildly elevated, I would not add any medication at this time  Total cholesterol is great. Trigs elevated, but great improvement. No changes at this time  Urine is good  A1c is great- 6.6  Cbc good  Psa normal

## 2023-12-26 NOTE — TELEPHONE ENCOUNTER
Sent pt Hyglos message relaying below    RELAY  Your vitamin D was low; Kathleen wants you to take an over the counter supplement daily. 5,000 units is adequate.  Your thyroid hormone was mildly elevated. Kathleen does not want to add any medication at this time but she will continue to monitor it.  Your total cholesterol was great; she does not want to make any changes at this time.  The rest of your labs were normal. Please let me know if you have any questions.

## 2023-12-28 ENCOUNTER — TELEPHONE (OUTPATIENT)
Dept: FAMILY MEDICINE CLINIC | Facility: CLINIC | Age: 53
End: 2023-12-28
Payer: COMMERCIAL

## 2023-12-28 NOTE — TELEPHONE ENCOUNTER
----- Message from BHARGAVI Dent sent at 12/28/2023  1:00 PM CST -----  Cologuard is positive. This does not indicate cancer, but does show abnormal findings.   Refer to GI for evaluation

## 2023-12-28 NOTE — TELEPHONE ENCOUNTER
Sent pt Sanergyhart message to pt relaying below    RELAY  Your cologuard screening was positive. This does not indicate colon cancer; it just warrants further evaluation. Kathleen sent a referral for you to GI. Their office will be in touch with you to set up an appointment. Please let me know if you have any questions.

## 2024-01-24 RX ORDER — FLUTICASONE PROPIONATE 50 MCG
2 SPRAY, SUSPENSION (ML) NASAL DAILY
Qty: 18.2 ML | Refills: 2 | Status: SHIPPED | OUTPATIENT
Start: 2024-01-24

## 2024-01-24 RX ORDER — AMOXICILLIN AND CLAVULANATE POTASSIUM 875; 125 MG/1; MG/1
1 TABLET, FILM COATED ORAL 2 TIMES DAILY
Qty: 20 TABLET | Refills: 0 | Status: SHIPPED | OUTPATIENT
Start: 2024-01-24

## 2024-01-24 RX ORDER — METHYLPREDNISOLONE 4 MG/1
TABLET ORAL
Qty: 1 EACH | Refills: 0 | Status: SHIPPED | OUTPATIENT
Start: 2024-01-24

## 2024-02-25 DIAGNOSIS — E11.8 DIABETES MELLITUS TYPE 2 WITH COMPLICATIONS: ICD-10-CM

## 2024-03-05 DIAGNOSIS — J11.1 FLU: Primary | ICD-10-CM

## 2024-03-05 RX ORDER — HYDROCODONE POLISTIREX AND CHLORPHENIRAMINE POLISTIREX 10; 8 MG/5ML; MG/5ML
5 SUSPENSION, EXTENDED RELEASE ORAL EVERY 12 HOURS PRN
Qty: 118 ML | Refills: 0 | Status: SHIPPED | OUTPATIENT
Start: 2024-03-05

## 2024-03-05 RX ORDER — BALOXAVIR MARBOXIL 80 MG/1
80 TABLET, FILM COATED ORAL ONCE
Qty: 1 EACH | Refills: 0 | Status: SHIPPED | OUTPATIENT
Start: 2024-03-05 | End: 2024-03-05

## 2024-03-06 ENCOUNTER — DOCUMENTATION (OUTPATIENT)
Dept: FAMILY MEDICINE CLINIC | Facility: CLINIC | Age: 54
End: 2024-03-06
Payer: COMMERCIAL

## 2024-03-06 DIAGNOSIS — J11.1 FLU: Primary | ICD-10-CM

## 2024-03-06 DIAGNOSIS — E11.8 DIABETES MELLITUS TYPE 2 WITH COMPLICATIONS: ICD-10-CM

## 2024-04-30 DIAGNOSIS — G62.9 NEUROPATHY: ICD-10-CM

## 2024-04-30 RX ORDER — GABAPENTIN 300 MG/1
300 CAPSULE ORAL NIGHTLY PRN
Qty: 30 CAPSULE | Refills: 2 | Status: SHIPPED | OUTPATIENT
Start: 2024-04-30

## 2024-04-30 NOTE — TELEPHONE ENCOUNTER
Rx Refill Note  Requested Prescriptions     Pending Prescriptions Disp Refills    gabapentin (NEURONTIN) 300 MG capsule [Pharmacy Med Name: GABAPENTIN 300 MG CAPSULE] 30 capsule 2     Sig: TAKE 1 CAPSULE BY MOUTH AT NIGHT AS NEEDED (DIABETIC NEUROPATHY).      Last office visit with prescribing clinician: 11/8/2023   Last telemedicine visit with prescribing clinician: Visit date not found   Next office visit with prescribing clinician: Visit date not found    No CSA on file   No UDS on file     Fe Bonilla MA  04/30/24, 13:56 CDT  
0 - No symptoms

## 2024-08-22 ENCOUNTER — TELEPHONE (OUTPATIENT)
Dept: FAMILY MEDICINE CLINIC | Facility: CLINIC | Age: 54
End: 2024-08-22
Payer: COMMERCIAL

## 2024-10-29 DIAGNOSIS — E11.8 DIABETES MELLITUS TYPE 2 WITH COMPLICATIONS: ICD-10-CM

## 2024-10-29 RX ORDER — SEMAGLUTIDE 1.34 MG/ML
0.5 INJECTION, SOLUTION SUBCUTANEOUS WEEKLY
Qty: 3 ML | Refills: 11 | Status: SHIPPED | OUTPATIENT
Start: 2024-10-29

## 2024-11-26 DIAGNOSIS — N52.8 OTHER MALE ERECTILE DYSFUNCTION: ICD-10-CM

## 2024-11-26 DIAGNOSIS — E11.8 DIABETES MELLITUS TYPE 2 WITH COMPLICATIONS: ICD-10-CM

## 2024-11-26 DIAGNOSIS — G62.9 NEUROPATHY: ICD-10-CM

## 2024-11-26 RX ORDER — TADALAFIL 10 MG/1
10 TABLET ORAL DAILY PRN
Qty: 10 TABLET | Refills: 5 | Status: SHIPPED | OUTPATIENT
Start: 2024-11-26

## 2024-11-26 RX ORDER — GABAPENTIN 300 MG/1
300 CAPSULE ORAL NIGHTLY PRN
Qty: 30 CAPSULE | Refills: 2 | Status: SHIPPED | OUTPATIENT
Start: 2024-11-26

## 2024-11-26 RX ORDER — SEMAGLUTIDE 1.34 MG/ML
0.5 INJECTION, SOLUTION SUBCUTANEOUS WEEKLY
Qty: 3 ML | Refills: 11 | Status: SHIPPED | OUTPATIENT
Start: 2024-11-26

## 2024-11-26 RX ORDER — DAPAGLIFLOZIN 10 MG/1
10 TABLET, FILM COATED ORAL DAILY
Qty: 30 TABLET | Refills: 11 | Status: SHIPPED | OUTPATIENT
Start: 2024-11-26

## 2024-12-03 ENCOUNTER — OFFICE VISIT (OUTPATIENT)
Dept: FAMILY MEDICINE CLINIC | Facility: CLINIC | Age: 54
End: 2024-12-03
Payer: COMMERCIAL

## 2024-12-03 VITALS
WEIGHT: 264 LBS | HEIGHT: 75 IN | OXYGEN SATURATION: 98 % | HEART RATE: 69 BPM | BODY MASS INDEX: 32.83 KG/M2 | TEMPERATURE: 97.8 F | SYSTOLIC BLOOD PRESSURE: 135 MMHG | DIASTOLIC BLOOD PRESSURE: 83 MMHG | RESPIRATION RATE: 20 BRPM

## 2024-12-03 DIAGNOSIS — E11.8 DIABETES MELLITUS TYPE 2 WITH COMPLICATIONS: ICD-10-CM

## 2024-12-03 DIAGNOSIS — Z00.00 WELLNESS EXAMINATION: Primary | ICD-10-CM

## 2024-12-03 DIAGNOSIS — Z11.59 ENCOUNTER FOR HEPATITIS C SCREENING TEST FOR LOW RISK PATIENT: ICD-10-CM

## 2024-12-03 DIAGNOSIS — Z12.5 SCREENING PSA (PROSTATE SPECIFIC ANTIGEN): ICD-10-CM

## 2024-12-03 DIAGNOSIS — G89.29 CHRONIC BILATERAL LOW BACK PAIN WITHOUT SCIATICA: ICD-10-CM

## 2024-12-03 DIAGNOSIS — M54.50 CHRONIC BILATERAL LOW BACK PAIN WITHOUT SCIATICA: ICD-10-CM

## 2024-12-03 PROCEDURE — 99396 PREV VISIT EST AGE 40-64: CPT | Performed by: NURSE PRACTITIONER

## 2024-12-03 NOTE — PROGRESS NOTES
"Chief Complaint  Annual Exam    Subjective    History of Present Illness      Patient presents to [facility] for   History of Present Illness  Pt is her today for his annual wellness visit.       Review of Systems    I have reviewed and agree with the HPI information as above.  BHARGAVI Dent     Objective   Vital Signs:   /83   Pulse 69   Temp 97.8 °F (36.6 °C) (Temporal)   Resp 20   Ht 190.5 cm (75\")   Wt 120 kg (264 lb)   SpO2 98%   BMI 33.00 kg/m²            Physical Exam  Vitals and nursing note reviewed.   Constitutional:       Appearance: Normal appearance. He is well-developed. He is obese.   HENT:      Head: Normocephalic and atraumatic.      Mouth/Throat:      Lips: Pink. No lesions.   Eyes:      General: Lids are normal. Vision grossly intact.      Conjunctiva/sclera: Conjunctivae normal.      Right eye: Right conjunctiva is not injected.      Left eye: Left conjunctiva is not injected.   Pulmonary:      Effort: Pulmonary effort is normal.   Musculoskeletal:         General: Normal range of motion.      Cervical back: Full passive range of motion without pain, normal range of motion and neck supple.   Skin:     General: Skin is dry.   Neurological:      Mental Status: He is alert and oriented to person, place, and time.      Motor: Motor function is intact.   Psychiatric:         Mood and Affect: Mood and affect normal.         Judgment: Judgment normal.          ORNNA-7: Over the last two weeks, how often have you been bothered by the following problems?  Feeling nervous, anxious or on edge: Not at all  Not being able to stop or control worrying: Not at all  Worrying too much about different things: Not at all  Trouble Relaxing: Not at all  Being so restless that it is hard to sit still: Not at all  Becoming easily annoyed or irritable: Not at all  Feeling afraid as if something awful might happen: Not at all  RONNA 7 Total Score: 0    PHQ-2 Depression " Screening    Little interest or pleasure in doing things? Not at all   Feeling down, depressed, or hopeless? Not at all   PHQ-2 Total Score 0      PHQ-9 Depression Screening  Little interest or pleasure in doing things? Not at all   Feeling down, depressed, or hopeless? Not at all   PHQ-2 Total Score 0   Trouble falling or staying asleep, or sleeping too much?     Feeling tired or having little energy?     Poor appetite or overeating?     Feeling bad about yourself - or that you are a failure or have let yourself or your family down?     Trouble concentrating on things, such as reading the newspaper or watching television?     Moving or speaking so slowly that other people could have noticed? Or the opposite - being so fidgety or restless that you have been moving around a lot more than usual?     Thoughts that you would be better off dead, or of hurting yourself in some way?     PHQ-9 Total Score     If you checked off any problems, how difficult have these problems made it for you to do your work, take care of things at home, or get along with other people? Not difficult at all           Result Review  Data Reviewed:                   Assessment and Plan      Diagnoses and all orders for this visit:    1. Wellness examination (Primary)  Comments:  Labs today    2. Diabetes mellitus type 2 with complications  Comments:  A1c was 6.6. increase ozempic to 1 mg  Orders:  -     CBC Auto Differential; Future  -     Comprehensive Metabolic Panel; Future  -     Hemoglobin A1c; Future  -     Urinalysis With Culture If Indicated - Urine, Clean Catch; Future  -     MicroAlbumin, Urine, Random - Urine, Clean Catch; Future  -     Lipid Panel; Future  -     Semaglutide, 1 MG/DOSE, (OZEMPIC) 4 MG/3ML solution pen-injector; Inject 1 mg under the skin into the appropriate area as directed 1 (One) Time Per Week.  Dispense: 3 mL; Refill: 11    3. Screening PSA (prostate specific antigen)  -     PSA SCREENING; Future    4. Encounter  for hepatitis C screening test for low risk patient  -     Hepatitis C antibody; Future    5. Chronic bilateral low back pain without sciatica  Comments:  requesting a muscle relaxer for back pain  Orders:  -     tiZANidine (Zanaflex) 4 MG tablet; Take 1 tablet by mouth At Night As Needed for Muscle Spasms.  Dispense: 30 tablet; Refill: 11            Follow Up   No follow-ups on file.  Patient was given instructions and counseling regarding his condition or for health maintenance advice. Please see specific information pulled into the AVS if appropriate.     Preventive Care 40-64 Years Old, Male  Preventive care refers to lifestyle choices and visits with your health care provider that can promote health and wellness. Preventive care visits are also called wellness exams.  What can I expect for my preventive care visit?  Counseling  During your preventive care visit, your health care provider may ask about your:  Medical history, including:  Past medical problems.  Family medical history.  Current health, including:  Emotional well-being.  Home life and relationship well-being.  Sexual activity.  Lifestyle, including:  Alcohol, nicotine or tobacco, and drug use.  Access to firearms.  Diet, exercise, and sleep habits.  Safety issues such as seatbelt and bike helmet use.  Sunscreen use.  Work and work environment.  Physical exam  Your health care provider will check your:  Height and weight. These may be used to calculate your BMI (body mass index). BMI is a measurement that tells if you are at a healthy weight.  Waist circumference. This measures the distance around your waistline. This measurement also tells if you are at a healthy weight and may help predict your risk of certain diseases, such as type 2 diabetes and high blood pressure.  Heart rate and blood pressure.  Body temperature.  Skin for abnormal spots.  What immunizations do I need?    Vaccines are usually given at various ages, according to a schedule.  Your health care provider will recommend vaccines for you based on your age, medical history, and lifestyle or other factors, such as travel or where you work.  What tests do I need?  Screening  Your health care provider may recommend screening tests for certain conditions. This may include:  Lipid and cholesterol levels.  Diabetes screening. This is done by checking your blood sugar (glucose) after you have not eaten for a while (fasting).  Hepatitis B test.  Hepatitis C test.  HIV (human immunodeficiency virus) test.  STI (sexually transmitted infection) testing, if you are at risk.  Lung cancer screening.  Prostate cancer screening.  Colorectal cancer screening.  Talk with your health care provider about your test results, treatment options, and if necessary, the need for more tests.  Follow these instructions at home:  Eating and drinking    Eat a diet that includes fresh fruits and vegetables, whole grains, lean protein, and low-fat dairy products.  Take vitamin and mineral supplements as recommended by your health care provider.  Do not drink alcohol if your health care provider tells you not to drink.  If you drink alcohol:  Limit how much you have to 0-2 drinks a day.  Know how much alcohol is in your drink. In the [place] one drink equals one 12 oz bottle of beer (355 mL), one 5 oz glass of wine (148 mL), or one 1½ oz glass of hard liquor (44 mL).  Lifestyle  Brush your teeth every morning and night with fluoride toothpaste. Floss one time each day.  Exercise for at least 30 minutes 5 or more days each week.  Do not use any products that contain nicotine or tobacco. These products include cigarettes, chewing tobacco, and vaping devices, such as e-cigarettes. If you need help quitting, ask your health care provider.  Do not use drugs.  If you are sexually active, practice safe sex. Use a condom or other form of protection to prevent STIs.  Take aspirin only as told by your health care provider. Make sure that  you understand how much to take and what form to take. Work with your health care provider to find out whether it is safe and beneficial for you to take aspirin daily.  Find healthy ways to manage stress, such as:  Meditation, yoga, or listening to music.  Journaling.  Talking to a trusted person.  Spending time with friends and family.  Minimize exposure to UV radiation to reduce your risk of skin cancer.  Safety  Always wear your seat belt while driving or riding in a vehicle.  Do not drive:  If you have been drinking alcohol. Do not ride with someone who has been drinking.  When you are tired or distracted.  While texting.  If you have been using any mind-altering substances or drugs.  Wear a helmet and other protective equipment during sports activities.  If you have firearms in your house, make sure you follow all gun safety procedures.  What's next?  Go to your health care provider once a year for an annual wellness visit.  Ask your health care provider how often you should have your eyes and teeth checked.  Stay up to date on all vaccines.  This information is not intended to replace advice given to you by your health care provider. Make sure you discuss any questions you have with your health care provider.  Document Revised: 06/15/2022 Document Reviewed: 06/15/2022  [employer] Patient Education © 2024 [employer]

## 2024-12-14 ENCOUNTER — DOCUMENTATION (OUTPATIENT)
Dept: FAMILY MEDICINE CLINIC | Facility: CLINIC | Age: 54
End: 2024-12-14
Payer: COMMERCIAL

## 2024-12-14 RX ORDER — DEXTROMETHORPHAN HYDROBROMIDE AND PROMETHAZINE HYDROCHLORIDE 15; 6.25 MG/5ML; MG/5ML
5 SYRUP ORAL 4 TIMES DAILY PRN
Qty: 180 ML | Refills: 0 | Status: SHIPPED | OUTPATIENT
Start: 2024-12-14

## 2024-12-14 RX ORDER — METHYLPREDNISOLONE 4 MG/1
TABLET ORAL
Qty: 21 TABLET | Refills: 0 | Status: SHIPPED | OUTPATIENT
Start: 2024-12-14

## 2024-12-14 RX ORDER — AZITHROMYCIN 250 MG/1
TABLET, FILM COATED ORAL
Qty: 6 TABLET | Refills: 0 | Status: SHIPPED | OUTPATIENT
Start: 2024-12-14

## 2024-12-30 ENCOUNTER — LAB (OUTPATIENT)
Dept: LAB | Facility: HOSPITAL | Age: 54
End: 2024-12-30
Payer: COMMERCIAL

## 2024-12-30 DIAGNOSIS — Z11.59 ENCOUNTER FOR HEPATITIS C SCREENING TEST FOR LOW RISK PATIENT: ICD-10-CM

## 2024-12-30 DIAGNOSIS — Z12.5 SCREENING PSA (PROSTATE SPECIFIC ANTIGEN): ICD-10-CM

## 2024-12-30 DIAGNOSIS — E11.8 DIABETES MELLITUS TYPE 2 WITH COMPLICATIONS: ICD-10-CM

## 2024-12-30 LAB
ALBUMIN SERPL-MCNC: 3.9 G/DL (ref 3.5–5)
ALBUMIN UR-MCNC: <1.2 MG/DL
ALBUMIN/GLOB SERPL: 1.3 G/DL (ref 1.1–2.5)
ALP SERPL-CCNC: 110 U/L (ref 24–120)
ALT SERPL W P-5'-P-CCNC: 26 U/L (ref 0–50)
ANION GAP SERPL CALCULATED.3IONS-SCNC: 6 MMOL/L (ref 4–13)
AST SERPL-CCNC: 23 U/L (ref 7–45)
AUTO MIXED CELLS #: 0.7 10*3/MM3 (ref 0.1–2.6)
AUTO MIXED CELLS %: 7.5 % (ref 0.1–24)
BILIRUB SERPL-MCNC: 0.6 MG/DL (ref 0.1–1)
BILIRUB UR QL STRIP: NEGATIVE
BUN SERPL-MCNC: 16 MG/DL (ref 5–21)
BUN/CREAT SERPL: 17.8
CALCIUM SPEC-SCNC: 9 MG/DL (ref 8.6–10.5)
CHLORIDE SERPL-SCNC: 108 MMOL/L (ref 98–110)
CHOLEST SERPL-MCNC: 238 MG/DL (ref 130–200)
CLARITY UR: CLEAR
CO2 SERPL-SCNC: 24 MMOL/L (ref 24–31)
COLOR UR: YELLOW
CREAT SERPL-MCNC: 0.9 MG/DL (ref 0.5–1.4)
EGFRCR SERPLBLD CKD-EPI 2021: 100.2 ML/MIN/1.73
ERYTHROCYTE [DISTWIDTH] IN BLOOD BY AUTOMATED COUNT: 13.7 % (ref 12.3–15.4)
GLOBULIN UR ELPH-MCNC: 2.9 GM/DL
GLUCOSE SERPL-MCNC: 194 MG/DL (ref 65–99)
GLUCOSE UR STRIP-MCNC: ABNORMAL MG/DL
HBA1C MFR BLD: 7.6 % (ref 4.8–5.9)
HCT VFR BLD AUTO: 50.8 % (ref 37.5–51)
HCV AB SER QL: NORMAL
HDLC SERPL-MCNC: 33 MG/DL
HGB BLD-MCNC: 16.9 G/DL (ref 13–17.7)
HGB UR QL STRIP.AUTO: NEGATIVE
KETONES UR QL STRIP: NEGATIVE
LDLC SERPL CALC-MCNC: ABNORMAL MG/DL
LDLC/HDLC SERPL: ABNORMAL {RATIO}
LEUKOCYTE ESTERASE UR QL STRIP.AUTO: NEGATIVE
LYMPHOCYTES # BLD AUTO: 1.7 10*3/MM3 (ref 0.7–3.1)
LYMPHOCYTES NFR BLD AUTO: 19.3 % (ref 19.6–45.3)
MCH RBC QN AUTO: 30.5 PG (ref 26.6–33)
MCHC RBC AUTO-ENTMCNC: 33.3 G/DL (ref 31.5–35.7)
MCV RBC AUTO: 91.7 FL (ref 79–97)
NEUTROPHILS NFR BLD AUTO: 6.3 10*3/MM3 (ref 1.7–7)
NEUTROPHILS NFR BLD AUTO: 73.2 % (ref 42.7–76)
NITRITE UR QL STRIP: NEGATIVE
PH UR STRIP.AUTO: 5.5 [PH] (ref 5–8)
PLATELET # BLD AUTO: 156 10*3/MM3 (ref 140–450)
PMV BLD AUTO: 10.9 FL (ref 6–12)
POTASSIUM SERPL-SCNC: 4.4 MMOL/L (ref 3.5–5.3)
PROT SERPL-MCNC: 6.8 G/DL (ref 6.3–8.7)
PROT UR QL STRIP: NEGATIVE
PSA SERPL-MCNC: 2.69 NG/ML (ref 0–4)
RBC # BLD AUTO: 5.54 10*6/MM3 (ref 4.14–5.8)
SODIUM SERPL-SCNC: 138 MMOL/L (ref 135–145)
SP GR UR STRIP: 1.02 (ref 1–1.03)
TRIGL SERPL-MCNC: 836 MG/DL (ref 0–149)
UROBILINOGEN UR QL STRIP: ABNORMAL
VLDLC SERPL-MCNC: ABNORMAL MG/DL
WBC NRBC COR # BLD AUTO: 8.7 10*3/MM3 (ref 3.4–10.8)

## 2024-12-30 PROCEDURE — G0103 PSA SCREENING: HCPCS

## 2024-12-30 PROCEDURE — 83036 HEMOGLOBIN GLYCOSYLATED A1C: CPT

## 2024-12-30 PROCEDURE — 85025 COMPLETE CBC W/AUTO DIFF WBC: CPT

## 2024-12-30 PROCEDURE — 80053 COMPREHEN METABOLIC PANEL: CPT

## 2024-12-30 PROCEDURE — 80061 LIPID PANEL: CPT

## 2024-12-30 PROCEDURE — 86803 HEPATITIS C AB TEST: CPT

## 2024-12-30 PROCEDURE — 36415 COLL VENOUS BLD VENIPUNCTURE: CPT

## 2024-12-30 PROCEDURE — 82043 UR ALBUMIN QUANTITATIVE: CPT

## 2024-12-30 PROCEDURE — 81003 URINALYSIS AUTO W/O SCOPE: CPT

## 2024-12-31 ENCOUNTER — TELEPHONE (OUTPATIENT)
Dept: FAMILY MEDICINE CLINIC | Facility: CLINIC | Age: 54
End: 2024-12-31
Payer: COMMERCIAL

## 2024-12-31 DIAGNOSIS — E78.00 HYPERCHOLESTEREMIA: Primary | ICD-10-CM

## 2024-12-31 DIAGNOSIS — E11.8 DIABETES MELLITUS TYPE 2 WITH COMPLICATIONS: ICD-10-CM

## 2024-12-31 RX ORDER — ATORVASTATIN CALCIUM 10 MG/1
10 TABLET, FILM COATED ORAL DAILY
Qty: 90 TABLET | Refills: 1 | Status: SHIPPED | OUTPATIENT
Start: 2024-12-31

## 2024-12-31 NOTE — TELEPHONE ENCOUNTER
----- Message from Purvi Bui sent at 12/31/2024  8:54 AM CST -----  Cholesterol and trigs are terrible- I have to start him on cholesterol medication. Its not an option at this time. I want to start lipitor 10mg and repatha injections.  His  trigs are 836!! An dLDL cannot even be calculated. That is not good. Please discuss this with him and I will send in medications  Cmp is ok,minus elevated blood sugar at 194. A1c is also worse at 7.6. I just increased his ozempic at the last visit. Needs to check labs in 3 months for medication adjustments.   Cbc normal  Psa normal

## 2024-12-31 NOTE — TELEPHONE ENCOUNTER
Called [facility] they do not have it in stock and it has been ordered to come in Thursday, unfortunately it will be the new years and will have to be done when they get it in.

## 2024-12-31 NOTE — PROGRESS NOTES
Cholesterol and trigs are terrible- I have to start him on cholesterol medication. Its not an option at this time. I want to start lipitor 10mg and repatha injections.  His  trigs are 836!! An dLDL cannot even be calculated. That is not good. Please discuss this with him and I will send in medications  Cmp is ok,minus elevated blood sugar at 194. A1c is also worse at 7.6. I just increased his ozempic at the last visit. Needs to check labs in 3 months for medication adjustments.   Cbc normal  Psa normal

## 2025-01-17 DIAGNOSIS — G62.9 NEUROPATHY: ICD-10-CM

## 2025-01-21 RX ORDER — GABAPENTIN 300 MG/1
300 CAPSULE ORAL NIGHTLY PRN
Qty: 30 CAPSULE | Refills: 2 | Status: SHIPPED | OUTPATIENT
Start: 2025-01-21

## 2025-03-18 RX ORDER — DEXTROMETHORPHAN HYDROBROMIDE AND PROMETHAZINE HYDROCHLORIDE 15; 6.25 MG/5ML; MG/5ML
SYRUP ORAL
Qty: 180 ML | Refills: 0 | OUTPATIENT
Start: 2025-03-18

## 2025-04-08 ENCOUNTER — DOCUMENTATION (OUTPATIENT)
Dept: FAMILY MEDICINE CLINIC | Facility: CLINIC | Age: 55
End: 2025-04-08
Payer: COMMERCIAL

## 2025-04-08 RX ORDER — ONDANSETRON 8 MG/1
8 TABLET, ORALLY DISINTEGRATING ORAL EVERY 8 HOURS PRN
Qty: 20 TABLET | Refills: 0 | Status: SHIPPED | OUTPATIENT
Start: 2025-04-08

## 2025-04-08 RX ORDER — PROMETHAZINE HYDROCHLORIDE 25 MG/1
25 TABLET ORAL EVERY 6 HOURS PRN
Qty: 20 TABLET | Refills: 0 | Status: SHIPPED | OUTPATIENT
Start: 2025-04-08

## 2025-06-28 DIAGNOSIS — E78.00 HYPERCHOLESTEREMIA: ICD-10-CM

## 2025-06-29 DIAGNOSIS — E78.00 HYPERCHOLESTEREMIA: ICD-10-CM

## 2025-06-30 NOTE — TELEPHONE ENCOUNTER
Rx Refill Note  Requested Prescriptions     Pending Prescriptions Disp Refills    atorvastatin (LIPITOR) 10 MG tablet [Pharmacy Med Name: ATORVASTATIN 10 MG TABLET] 90 tablet 1     Sig: TAKE 1 TABLET BY MOUTH EVERY DAY      Last office visit with prescribing clinician: 12/3/2024   Last telemedicine visit with prescribing clinician: Visit date not found   Next office visit with prescribing clinician: Visit date not found                         Would you like a call back once the refill request has been completed: [] Yes [] No    If the office needs to give you a call back, can they leave a voicemail: [] Yes [] No    Laurie Causey MA  06/30/25, 12:04 CDT

## 2025-07-01 RX ORDER — ATORVASTATIN CALCIUM 10 MG/1
10 TABLET, FILM COATED ORAL DAILY
Qty: 90 TABLET | Refills: 1 | Status: SHIPPED | OUTPATIENT
Start: 2025-07-01

## 2025-07-02 RX ORDER — EVOLOCUMAB 140 MG/ML
INJECTION, SOLUTION SUBCUTANEOUS
Qty: 6 EACH | Refills: 1 | Status: SHIPPED | OUTPATIENT
Start: 2025-07-02

## 2025-07-02 NOTE — TELEPHONE ENCOUNTER
Rx Refill Note  Requested Prescriptions     Pending Prescriptions Disp Refills    Repatha solution prefilled syringe injection [Pharmacy Med Name: REPATHA 140 MG/ML SYRINGE] 6 each 1     Sig: INJECT 1 ML UNDER THE SKIN INTO THE APPROPRIATE AREA AS DIRECTED EVERY 14 (FOURTEEN) DAYS FOR 90 DAYS.      Last office visit with prescribing clinician: 12/3/2024   Last telemedicine visit with prescribing clinician: Visit date not found   Next office visit with prescribing clinician: 6/29/2025                         Would you like a call back once the refill request has been completed: [] Yes [] No    If the office needs to give you a call back, can they leave a voicemail: [] Yes [] No    Laurie Causey MA  07/02/25, 10:08 CDT

## 2025-07-21 DIAGNOSIS — G62.9 NEUROPATHY: ICD-10-CM

## 2025-07-21 RX ORDER — GABAPENTIN 300 MG/1
300 CAPSULE ORAL NIGHTLY PRN
Qty: 30 CAPSULE | OUTPATIENT
Start: 2025-07-21